# Patient Record
Sex: FEMALE | Race: WHITE
[De-identification: names, ages, dates, MRNs, and addresses within clinical notes are randomized per-mention and may not be internally consistent; named-entity substitution may affect disease eponyms.]

---

## 2017-03-09 ENCOUNTER — HOSPITAL ENCOUNTER (EMERGENCY)
Dept: HOSPITAL 26 - MED | Age: 27
Discharge: HOME | End: 2017-03-09
Payer: SELF-PAY

## 2017-03-09 VITALS — SYSTOLIC BLOOD PRESSURE: 155 MMHG | DIASTOLIC BLOOD PRESSURE: 98 MMHG

## 2017-03-09 VITALS — HEIGHT: 62 IN | BODY MASS INDEX: 35.33 KG/M2 | WEIGHT: 192 LBS

## 2017-03-09 VITALS — DIASTOLIC BLOOD PRESSURE: 98 MMHG | SYSTOLIC BLOOD PRESSURE: 155 MMHG

## 2017-03-09 DIAGNOSIS — L02.413: Primary | ICD-10-CM

## 2017-03-09 PROCEDURE — 99283 EMERGENCY DEPT VISIT LOW MDM: CPT

## 2017-03-09 PROCEDURE — 10060 I&D ABSCESS SIMPLE/SINGLE: CPT

## 2017-03-09 NOTE — NUR
PATIENT PRESENTS TO ED WITH SPIDER BITE X 4 DAYS AGO, NOT PRESENTS WITH RIGHT 
WRIST SWELLING, APPEARS TO BE AN ABCESS TO WRIST . DENIES N/V/D; SKIN IS 
PINK/WARM/DRY; AAOX4 WITH EVEN AND STEADY GAIT;  PT DENIES ANY FEVER, CP, SOB, 
OR COUGH AT THIS TIME; PATIENT STATES PAIN OF 5/10 AT THIS TIME; VSS; PATIENT 
POSITIONED FOR COMFORT; HOB ELEVATED;; BED DOWN. ER MD MADE AWARE OF PT STATUS.

## 2017-03-09 NOTE — NUR
Patient discharged with v/s stable. Written and verbal after care instructions 
given and explained. 

Patient alert, oriented and verbalized understanding of instructions. 
Ambulatory with steady gait. All questions addressed prior to discharge. ID 
band removed. Patient advised to follow up with PMD. Rx of KEFLEX, BACTRIM, 
MOTRIN, NORCO given. Patient educated on indication of medication including 
possible reaction and side effects. Opportunity to ask questions provided and 
answered.

## 2018-12-20 ENCOUNTER — HOSPITAL ENCOUNTER (EMERGENCY)
Dept: HOSPITAL 26 - MED | Age: 28
Discharge: LEFT BEFORE DISCHARGE | End: 2018-12-20
Payer: SELF-PAY

## 2018-12-20 DIAGNOSIS — Z53.21: Primary | ICD-10-CM

## 2018-12-20 NOTE — NUR
CALLED FOR THE THIRD TIME NO RESPONSE.PATIENT LEFT WITHOUT BEING SEEN BY DR. FARLEY. NO FURTHER CARE PROVIDED FOR PATIENT.